# Patient Record
Sex: FEMALE | Race: WHITE | NOT HISPANIC OR LATINO | URBAN - METROPOLITAN AREA
[De-identification: names, ages, dates, MRNs, and addresses within clinical notes are randomized per-mention and may not be internally consistent; named-entity substitution may affect disease eponyms.]

---

## 2017-05-09 ENCOUNTER — IMPORTED ENCOUNTER (OUTPATIENT)
Dept: URBAN - METROPOLITAN AREA CLINIC 31 | Facility: CLINIC | Age: 82
End: 2017-05-09

## 2017-05-09 PROBLEM — Z96.1: Noted: 2017-05-09

## 2017-05-09 PROBLEM — H18.51: Noted: 2017-05-09

## 2017-05-09 PROCEDURE — 92134 CPTRZ OPH DX IMG PST SGM RTA: CPT

## 2017-05-09 PROCEDURE — 92014 COMPRE OPH EXAM EST PT 1/>: CPT

## 2017-05-09 PROCEDURE — 92015 DETERMINE REFRACTIVE STATE: CPT

## 2017-05-09 NOTE — PATIENT DISCUSSION
1.  Pseudophakia OU - IOLs stable. Monitor. 2. Fuchs Dystrophy OU: Recommend clinical observation. Advised use of Leon 128 drops in affected eye if worsening. 3. Return for an appointment in 1 year for comprehensive exam. with Dr. Porfirio Jacobson. 4.   Change glasses.

## 2018-05-09 ENCOUNTER — IMPORTED ENCOUNTER (OUTPATIENT)
Dept: URBAN - METROPOLITAN AREA CLINIC 31 | Facility: CLINIC | Age: 83
End: 2018-05-09

## 2018-05-09 PROBLEM — H18.51: Noted: 2018-05-09

## 2018-05-09 PROBLEM — Z96.1: Noted: 2018-05-09

## 2018-05-09 PROCEDURE — 92014 COMPRE OPH EXAM EST PT 1/>: CPT

## 2018-05-09 NOTE — PATIENT DISCUSSION
Fuchs Dystrophy OU: Recommend clinical observation. Advised use of Leon 128 drops in affected eye if worsening.

## 2018-05-09 NOTE — PATIENT DISCUSSION
1.  Pseudophakia OU - IOLs stable. Monitor. 2. Fuchs Dystrophy OU: Recommend clinical observation. Advised use of Leon 128 drops in affected eye if worsening. 3. Refractive error - No glasses change. 4.  Return for an appointment in 1 year for comprehensive exam. with Dr. Porfirio Jacobson.

## 2019-01-10 NOTE — PATIENT DISCUSSION
No Retinal holes, Tears or Detachments.

## 2021-02-17 NOTE — PROCEDURE NOTE: CLINICAL
PROCEDURE NOTE: Excision and Repair of Eyelid; Up to One-Fourth of Lid Margin Left Upper Lid. Diagnosis: Chalazion, Chronic. Anesthesia: Local. Risks, benefits and alternatives were discussed. Patient desires to proceed with incision and drainage of the chalazion today. A drop of proparacaine was instilled into the involved eye. The area was prepped using an alcohol wipe and anesthetized using 1% lidocaine with epi. A chalazion clamp was placed on the eyelid and a full thickness incision was made using a 15 blade. The chalazion was removed using a curette and mini Mahendra scissors. Cautery was used to achieve hemostasis. Erythromycin ophthalmic ointment was applied. The patient tolerated the procedure well and left in good condition. Post op instructions were given and they understand to call for any concerns. Jhonny Owens

## 2021-11-22 ENCOUNTER — IMPORTED ENCOUNTER (OUTPATIENT)
Dept: URBAN - METROPOLITAN AREA CLINIC 31 | Facility: CLINIC | Age: 86
End: 2021-11-22

## 2021-11-22 PROBLEM — H50.21: Noted: 2021-11-22

## 2021-11-22 PROBLEM — H50.10: Noted: 2021-11-22

## 2021-11-22 PROBLEM — H18.513: Noted: 2021-11-22

## 2021-11-22 PROBLEM — Z96.1: Noted: 2021-11-22

## 2021-11-22 PROBLEM — H53.2: Noted: 2021-11-22

## 2021-11-22 PROBLEM — H18.51: Noted: 2021-11-22

## 2021-11-22 PROCEDURE — 92015 DETERMINE REFRACTIVE STATE: CPT

## 2021-11-22 PROCEDURE — 99214 OFFICE O/P EST MOD 30 MIN: CPT

## 2021-11-22 PROCEDURE — 92060 SENSORIMOTOR EXAMINATION: CPT

## 2021-11-22 NOTE — PATIENT DISCUSSION
1.  Diplopia - intermittent. Secondary to right hyper with exo deviation. Pt still has adequate fusional ability. 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 2. Pseudophakia OU - IOLs stable. Monitor. 3. Fuchs Dystrophy OU: Recommend clinical observation. Advised use of Leon 128 drops in affected eye if worsening. 4. Refractive error - Change glasses but hold on prism since diplopia very intermittent at this time. . 5.  Return for an appointment in 1 year for comprehensive exam with Dr. Chandana Marlow. To call before if any changes in intermittent diplopia.

## 2021-11-22 NOTE — PATIENT DISCUSSION
1.  Pseudophakia OU - IOLs stable. Monitor. 2. Fuchs Dystrophy OU: Recommend clinical observation. Advised use of Leon 128 drops in affected eye if worsening. 3. Refractive error - No glasses change. 4.  Return for an appointment in 1 year for comprehensive exam with Dr. Elsa Sheehan. To call before if any changes in intermittent diplopia.

## 2022-04-01 ASSESSMENT — TONOMETRY
OD_IOP_MMHG: 17
OS_IOP_MMHG: 17
OD_IOP_MMHG: 19
OS_IOP_MMHG: 20
OS_IOP_MMHG: 19
OD_IOP_MMHG: 20

## 2022-04-01 ASSESSMENT — VISUAL ACUITY
OU_CC: 20/70
OS_SC: 20/30
OS_SC: 20/80
OU_SC: 20/25-1
OD_SC: 20/40
OS_SC: 20/40
OU_CC: 20/30
OS_PH: CC 20/40
OS_CC: 20/80
OD_SC: 20/40
OD_SC: 20/30+1
OD_CC: 20/40

## 2022-11-22 ENCOUNTER — COMPREHENSIVE EXAM (OUTPATIENT)
Dept: URBAN - METROPOLITAN AREA CLINIC 29 | Facility: CLINIC | Age: 87
End: 2022-11-22

## 2022-11-22 DIAGNOSIS — H18.513: ICD-10-CM

## 2022-11-22 DIAGNOSIS — H50.10: ICD-10-CM

## 2022-11-22 DIAGNOSIS — H53.2: ICD-10-CM

## 2022-11-22 DIAGNOSIS — Z96.1: ICD-10-CM

## 2022-11-22 PROCEDURE — 99214 OFFICE O/P EST MOD 30 MIN: CPT

## 2022-11-22 ASSESSMENT — VISUAL ACUITY
OU_CC: 20/25
OS_SC: 20/30
OU_CC: 20/30
OD_SC: 20/30
OU_SC: 20/30
OS_SC: 20/100
OS_CC: 20/30
OD_SC: 20/100
OU_SC: 20/100
OD_CC: 20/25
OD_CC: 20/30
OS_CC: 20/30-2

## 2022-11-22 ASSESSMENT — TONOMETRY
OD_IOP_MMHG: 18
OS_IOP_MMHG: 15

## 2022-11-22 NOTE — PATIENT DISCUSSION
Longstanding intermittent diplopia. Not symptomatic at this point with good fusional ability. Stable. Monitor.